# Patient Record
Sex: MALE | Race: WHITE | URBAN - NONMETROPOLITAN AREA
[De-identification: names, ages, dates, MRNs, and addresses within clinical notes are randomized per-mention and may not be internally consistent; named-entity substitution may affect disease eponyms.]

---

## 2018-01-07 ENCOUNTER — NURSE TRIAGE (OUTPATIENT)
Dept: ADMINISTRATIVE | Age: 58
End: 2018-01-07

## 2023-05-01 ENCOUNTER — HOSPITAL ENCOUNTER (OUTPATIENT)
Dept: AUDIOLOGY | Age: 63
Discharge: HOME | End: 2023-05-01
Payer: COMMERCIAL

## 2023-05-01 PROCEDURE — 92557 COMPREHENSIVE HEARING TEST: CPT | Performed by: AUDIOLOGIST

## 2023-05-01 NOTE — PROGRESS NOTES
AUDIOLOGICAL EVALUATION      REASON FOR TESTING:  Patient concerned about possible hearing loss as he does not always hear rapid speech clearly or he feels that people do not speak clearly. He has a history of occupational noise exposure with consistent hearing protection use. He also has noise exposure related to flying, trap shooting, hunting and use of power tools. He wears foam ear plugs when trap shooting but did not use hearing protection while hunting. He wears sound cancelling ear buds while mowing. He denies tinnitus, otalgia, ear fullness, ear pressure, dizziness and vertigo. OTOSCOPY: Clear canal with normal appearing tympanic membrane- bilaterally. AUDIOGRAM          Reliability: Good    COMMENTS: Normal hearing at 250-3000 Hz dropping to a moderate rising to mild sensorineural hearing loss at 6809-7632 Hz for the left ear. Normal at 250-4000 Hz dropping to a borderline normal hearing at 3566-7973 Hz in the right ear. An asymmetry is noted with the left ear being poorer at 2981-8043 Hz. Word recognition ability is excellent at 100% for the left ear and excellent at 100% for the right ear when tested at an average conversational speech level. Tympanometry not completed. RECOMMENDATION(S):   1- Follow up with Dr. Kobe Ng regarding these results and any further testing or treatment recommendations. 2- Further testing may be considered to rule out possible retrocochlear pathology due to asymmetry. 3- Repeat testing as medically indicated or in 6 months to monitor for progression, sooner with any significant changes or new concerns.

## 2025-02-24 ENCOUNTER — HOSPITAL ENCOUNTER (OUTPATIENT)
Dept: PHYSICAL THERAPY | Age: 65
Setting detail: THERAPIES SERIES
Discharge: HOME | End: 2025-02-24
Payer: COMMERCIAL

## 2025-02-24 PROCEDURE — 97535 SELF CARE MNGMENT TRAINING: CPT

## 2025-02-24 PROCEDURE — 97530 THERAPEUTIC ACTIVITIES: CPT

## 2025-02-24 PROCEDURE — 97112 NEUROMUSCULAR REEDUCATION: CPT

## 2025-02-24 PROCEDURE — 97167 OT EVAL HIGH COMPLEX 60 MIN: CPT

## 2025-02-24 NOTE — PROGRESS NOTES
pulling before and after with difficulty starting stream   “Falling Out” Feeling: no   Urinate more than 7 times/day: yes   Restrict Fluid Intake: Yes with flying   Stream Strength Weak     BOWEL ASSESSMENT [] Deferred secondary to:   Frequency: Daily    Most Common Stool Consistency: [place] type 3   SYMPTOM QUESTIONNAIRE   Strain to have a BM: yes: intermittently will rock back and forth    Include fiber in your diet: No takes centrum silver  Does fresh fruits   Take laxatives/enemas regularly: no   Pain with BM: no   Strong urge to have BM: yes: with take off and landing   Leak/Stain Feces: At times with softer stools     Diarrhea often: no    History of Hemmrhiods      SEXUAL ASSESSMENT [] Deferred secondary to:   Sexually Active yes   Pain with [place] (Dyspareunia) Recently sustaining an erection for a few months         VITALS [] Deferred secondary to:   Height Ht Readings from Last 3 Encounters:   No data found for Ht      Weight Wt Readings from Last 3 Encounters:   No data found for Wt         GENERAL ASSESSMENT   [] Deferred secondary to:   Palpation    Observation    Posture {PT PELVIC HEALTH POSTURE:81455}   Range of Motion {PT AROM:49382}   Strength {PT STRENGTH:28606}   Gait {WNL WFL IMPAIRED:45799}   Sensation {WNL WFL IMPAIRED:58528}   Edema {WNL WFL IMPAIRED:42318}   Balance/Fall History {PT FALL HISTORY:60808}   Special Tests            OBSERVATION  [] Deferred secondary to:   Patient Safety {PT PELVIC FLOOR CHAPERONE:99699}   Skin Condition    Urogenital Triangle {PT UROGENITAL TRIANGLE:98668}   Introitus     Perineal Descent     External Clock         PELVIC FLOOR INTERNAL EXAM [] Deferred secondary to:   Exam {PT PELVIC FLOOR LOCATION:35153}   Sensation {PT PELVIC FLOOR SENSATION:04836}   Muscle Localization {PT MUSCLE LOCALIZATION:61363}   Palpation/Tone {PT PELVIC FLOOR TONE:33750}   Pelvic Floor Strength {PT PELVIC FLOOR STRENGTH:69135}   Relax after Contraction {PT PELVIC FLOOR

## 2025-03-04 ENCOUNTER — HOSPITAL ENCOUNTER (OUTPATIENT)
Dept: PHYSICAL THERAPY | Age: 65
Setting detail: THERAPIES SERIES
Discharge: HOME | End: 2025-03-04
Payer: COMMERCIAL

## 2025-03-04 PROCEDURE — 97140 MANUAL THERAPY 1/> REGIONS: CPT

## 2025-03-04 PROCEDURE — 97112 NEUROMUSCULAR REEDUCATION: CPT

## 2025-03-04 PROCEDURE — 97530 THERAPEUTIC ACTIVITIES: CPT

## 2025-03-04 PROCEDURE — 97535 SELF CARE MNGMENT TRAINING: CPT

## 2025-03-04 NOTE — PROGRESS NOTES
treatment.  This pt will demo independence with advanced HEP in order to allow gains in therapy to be maintained long term and reduce the risk of further medical need/assessment.    This pt will demo 4+/5 strength of the core with habitual inferior to superior contraction in order to increase pelvic organ support during cough/sneeze/lift and to promote continence via effective pelvic brace.      The pt will abolish nocturia in order to attain restful sleep patterns.       PLAN:  Treatment Recommendations: Strengthening, Neuromuscular Re-education, Manual Therapy - Soft Tissue Mobilization, Manual Therapy - Joint Manipulation, Home Exercise Program, Patient Education, Self-Care Education and Training, and Integrative Dry Needling    [x]  Plan of care initiated.  Plan to see patient weekly for 10 visits to address the treatment planned outlined above.  []  Continue with current plan of care  []  Modify plan of care as follows:    []  Hold pending physician visit  []  Discharge    Time In 0900   Time Out 1030   Timed Code Minutes: 90 min   Total Treatment Time: 90 min   ASHLEY Morfin, OTR/L  Lic #: [id number]      Electronically Signed by: Asia Valerio OT

## 2025-03-11 ENCOUNTER — HOSPITAL ENCOUNTER (OUTPATIENT)
Dept: PHYSICAL THERAPY | Age: 65
Setting detail: THERAPIES SERIES
Discharge: HOME | End: 2025-03-11
Payer: COMMERCIAL

## 2025-03-11 PROCEDURE — 97530 THERAPEUTIC ACTIVITIES: CPT

## 2025-03-11 PROCEDURE — 97112 NEUROMUSCULAR REEDUCATION: CPT

## 2025-03-11 NOTE — PROGRESS NOTES
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   Allergies NKA   Medications No current outpatient medications on file.Atorvastatin (40MG), Cartia (120 MG)  Finasteride (5mg)  Levothroxine (88 mcg)  Losartan (100Mg)  Nebivolol (10 Mg)  Nystatin 908457 unit/GM  Tadalafil 5 Mg  Tamsulosin HCI.      Functional Outcome Measure Used NIH-CPSI   Functional Outcome Score 24 (25)       Insurance Primary: Payor: WILLIE AGUIRRE /  /  / ,   Secondary:    Authorization Information PRE CERTIFICATION REQUIRED: Additional authorization not required.  INSURANCE THERAPY BENEFIT: Allowed 60 visits of OT, PT, and ST COMBINED per calendar year.  0 visits have been used.. Hard Max..   AQUATIC THERAPY COVERED: Yes  MODALITIES COVERED:  Yes   Initial CPT Codes Requested 97110-Therapeutic Exercise,  97140-Manual Therapy, 97530-Therapeutic Activities, 90593-FU ADL Training, and 03551-Dwcfsszkguuiz Re-Education   Progress Note Counter 3/10 for progress note (Reporting Period: 25 to Present    )   Recertification Date 25   Physician Follow-Up Unknown this date   Physician Orders

## 2025-03-25 ENCOUNTER — APPOINTMENT (OUTPATIENT)
Dept: PHYSICAL THERAPY | Age: 65
End: 2025-03-25
Payer: COMMERCIAL

## 2025-04-03 ENCOUNTER — APPOINTMENT (OUTPATIENT)
Dept: PHYSICAL THERAPY | Age: 65
End: 2025-04-03
Payer: COMMERCIAL

## 2025-04-14 ENCOUNTER — APPOINTMENT (OUTPATIENT)
Dept: PHYSICAL THERAPY | Age: 65
End: 2025-04-14
Payer: COMMERCIAL

## 2025-04-15 ENCOUNTER — HOSPITAL ENCOUNTER (OUTPATIENT)
Dept: PHYSICAL THERAPY | Age: 65
Setting detail: THERAPIES SERIES
Discharge: HOME | End: 2025-04-15
Payer: COMMERCIAL

## 2025-04-15 PROCEDURE — 97535 SELF CARE MNGMENT TRAINING: CPT

## 2025-04-15 PROCEDURE — 97530 THERAPEUTIC ACTIVITIES: CPT

## 2025-04-15 PROCEDURE — 97112 NEUROMUSCULAR REEDUCATION: CPT

## 2025-04-15 NOTE — PROGRESS NOTES
[facility]  OCCUPATIONAL THERAPY  OUTPATIENT REHABILITATION - SPECIALIZED THERAPY SERVICES  [] PELVIC HEALTH EVALUATION  [x] DAILY NOTE  [] PROGRESS NOTE [] DISCHARGE NOTE    Date: 4/15/2025  Patient Name:  Cruz Da Silva  : 1960  MRN: [id number]  CSN: [id number]    Referring Practitioner Jesus Correia MD [id number]      Diagnosis  Diagnoses       N40.1 (ICD-10-CM) - Benign prostatic hyperplasia with lower urinary tract symptoms    N13.8 (ICD-10-CM) - Other obstructive and reflux uropathy    M62.89 (ICD-10-CM) - Other specified disorders of muscle           Treatment Diagnosis M62.89 - Other Specified Disorders of Muscle  R39.14 - Feeling of Incomplete Bladder Emptying  R33.8 - Other Retention of Urine  N40.1 - Enlarged Prostate with Lower Urinary Tract Symptoms  R15.2 - Fecal Urgency      Date of Evaluation 25   Additional Pertinent History  See below.   Allergies NKA   Medications No current outpatient medications on file.Atorvastatin (40MG), Cartia (120 MG)  Finasteride (5mg)  Levothroxine (88 mcg)  Losartan (100Mg)  Nebivolol (10 Mg)  Nystatin 190159 unit/GM  Tadalafil 5 Mg  Tamsulosin HCI.      Functional Outcome Measure Used NIH-CPSI   Functional Outcome Score 24 (25)       Insurance Primary: Payor: WILLIE AGUIRRE /  /  / ,   Secondary:    Authorization Information PRE CERTIFICATION REQUIRED: Additional authorization not required.  INSURANCE THERAPY BENEFIT: Allowed 60 visits of OT, PT, and ST COMBINED per calendar year.  0 visits have been used.. Hard Max..   AQUATIC THERAPY COVERED: Yes  MODALITIES COVERED:  Yes   Initial CPT Codes Requested 97110-Therapeutic Exercise,  97140-Manual Therapy, 97530-Therapeutic Activities, 01636-RS ADL Training, and 07108-Rutcetpeqflwi Re-Education   Progress Note Counter 4/10 for progress note (Reporting Period: 25 to Present    )   Recertification Date 25   Physician Follow-Up Unknown this date   Physician Orders

## 2025-04-28 ENCOUNTER — HOSPITAL ENCOUNTER (OUTPATIENT)
Dept: PHYSICAL THERAPY | Age: 65
Setting detail: THERAPIES SERIES
Discharge: HOME | End: 2025-04-28
Payer: COMMERCIAL

## 2025-04-28 PROCEDURE — 97112 NEUROMUSCULAR REEDUCATION: CPT

## 2025-04-28 PROCEDURE — 97535 SELF CARE MNGMENT TRAINING: CPT

## 2025-04-28 NOTE — PROGRESS NOTES
localization and control X35 mins  x PFM lengthening on yoga ball to with coordinated breathing; with better patient understanding PFM lengthening with C-R-B.   Weighted breathing to increase diaphramatic breath with 3-4-5 sequence then same sequence unweighted.     Discussed d/t breath support with bear down. After education verbal, tactile and visual cues pt able to better correct/catch sensation of dyssynergia and able to extinguish PFM activation. This is improvement over prior sessions. Mirror feedback to observe kegel and dyssynergic bear down.     Prior tx:Via TATI dyssynergia noted with VC to relax PFM and facilitate TrA. Difficulty to extinguish PFM during TrA but pt required fading cues x10 reps.     Seated on swiss ball to work on PFM lengthening with inhale. Increased time to facilitate 360* diaphragmatic breathing with tband posterior ribs to increase proprioceptive input.      Manual x10 mins    To B coccygeus, B OI and LA for hypertonicity with referred urinary symptoms reports per pt.    Hip alignment 10 mins    Edu on A & P pelvis in relation to PFM implications   Kegel elevators x2   HEP on hold   Kegel quick x10   HEP   Kegel hold x10 3s  HEP   Tummy tuck quick x10   HEP   Tummy tuck hold x5 3s  HEP   Pelvic Brace Quick       Pelvic Brace Hold       Diaphragmatic breathing X15 mins    EDU on intercostal muscles, use of 360* vs. Belly breathing. Noted wide rib angle. EDU on tucking ribs under with exhale. Significant education on posture.    OI stretch       PFM stretch x2   HEP   Piriformis stretch x2   HEP   HS stretch x2   HEP IR rotation to increase stability in R hip   Marciano stretch x2   HEP   Adductor stretch x2   HEP edu on sustained stretch Vs adduction movement                  Kegel with Ball Squeeze       Kegel with Band                     Pelvic Tilt x10   HEP   Pelvic Tilt with arm lift x10   HEP   Pelvic Tilt with leg march x10   HEP   Pelvic Tilt with opposites x10   HEP   Heel walk

## 2025-05-01 ENCOUNTER — APPOINTMENT (OUTPATIENT)
Dept: PHYSICAL THERAPY | Age: 65
End: 2025-05-01
Payer: COMMERCIAL

## 2025-05-12 ENCOUNTER — HOSPITAL ENCOUNTER (OUTPATIENT)
Dept: PHYSICAL THERAPY | Age: 65
Setting detail: THERAPIES SERIES
Discharge: HOME | End: 2025-05-12
Payer: COMMERCIAL

## 2025-05-12 ENCOUNTER — APPOINTMENT (OUTPATIENT)
Dept: PHYSICAL THERAPY | Age: 65
End: 2025-05-12
Payer: COMMERCIAL

## 2025-05-12 PROCEDURE — 97140 MANUAL THERAPY 1/> REGIONS: CPT

## 2025-05-12 PROCEDURE — 97535 SELF CARE MNGMENT TRAINING: CPT

## 2025-05-12 PROCEDURE — 97110 THERAPEUTIC EXERCISES: CPT

## 2025-05-12 NOTE — PROGRESS NOTES
[facility]  OCCUPATIONAL THERAPY  OUTPATIENT REHABILITATION - SPECIALIZED THERAPY SERVICES  [] PELVIC HEALTH EVALUATION  [x] DAILY NOTE  [] PROGRESS NOTE [] DISCHARGE NOTE    Date: 2025  Patient Name:  Cruz Da Silva  : 1960  MRN: [id number]  CSN: [id number]    Referring Practitioner  [id number]      Diagnosis  Diagnoses       N40.1 (ICD-10-CM) - Benign prostatic hyperplasia with lower urinary tract symptoms    N13.8 (ICD-10-CM) - Other obstructive and reflux uropathy    M62.89 (ICD-10-CM) - Other specified disorders of muscle           Treatment Diagnosis M62.89 - Other Specified Disorders of Muscle  R39.14 - Feeling of Incomplete Bladder Emptying  R33.8 - Other Retention of Urine  N40.1 - Enlarged Prostate with Lower Urinary Tract Symptoms  R15.2 - Fecal Urgency      Date of Evaluation 25   Additional Pertinent History  See below.   Allergies NKA   Medications No current outpatient medications on file.Atorvastatin (40MG), Cartia (120 MG)  Finasteride (5mg)  Levothroxine (88 mcg)  Losartan (100Mg)  Nebivolol (10 Mg)  Nystatin 476518 unit/GM  Tadalafil 5 Mg  Tamsulosin HCI.      Functional Outcome Measure Used NIH-CPSI   Functional Outcome Score 24 (25)       Insurance Primary: Payor: WILLIE AGUIRRE /  /  / ,   Secondary:    Authorization Information PRE CERTIFICATION REQUIRED: Additional authorization not required.  INSURANCE THERAPY BENEFIT: Allowed 60 visits of OT, PT, and ST COMBINED per calendar year.  0 visits have been used.. Hard Max..   AQUATIC THERAPY COVERED: Yes  MODALITIES COVERED:  Yes   Initial CPT Codes Requested 97110-Therapeutic Exercise,  97140-Manual Therapy, 97530-Therapeutic Activities, 50000-RI ADL Training, and 02713-Uoxcobczmaxhb Re-Education   Progress Note Counter 6/10 for progress note (Reporting Period: 25 to Present    )   Recertification Date 25   Physician Follow-Up Unknown this date   Physician Orders Evaluate and treat   History

## 2025-05-13 ENCOUNTER — APPOINTMENT (OUTPATIENT)
Dept: PHYSICAL THERAPY | Age: 65
End: 2025-05-13
Payer: COMMERCIAL

## 2025-06-10 ENCOUNTER — HOSPITAL ENCOUNTER (OUTPATIENT)
Dept: PHYSICAL THERAPY | Age: 65
Setting detail: THERAPIES SERIES
Discharge: HOME | End: 2025-06-10
Payer: COMMERCIAL

## 2025-06-10 PROCEDURE — 97140 MANUAL THERAPY 1/> REGIONS: CPT

## 2025-06-10 PROCEDURE — 97112 NEUROMUSCULAR REEDUCATION: CPT

## 2025-06-10 PROCEDURE — 97535 SELF CARE MNGMENT TRAINING: CPT

## 2025-06-10 NOTE — PROGRESS NOTES
deficits which could lead to decreased engagement in meaningful daily occupations and quality of life.    Body Structures/Functions/Activity Limitations: PFM weakness with decreased localization and endurance, PFM dyssynergia, Poor PFM control with limited ability to completely relax, Decreased awareness of normal bladder and bowel habits, control, and diet effects on functioning, Abdominal and core weakness, Limited hip girdle flexibility, Impaired coordination, Impaired motor control, and Impaired strength  Prognosis: Good    GOALS:  Patient Goal:  planes without urinary concerns    Short Term Goal Timeframe: 5 Weeks  Short Term Goals:  Patient to identify normal bladder function and voiding patterns, diet effects on bladder, urge control strategies, and constipation/optimal stool consistency management.  Patient to delay voiding for 15 minute with out leaking.  Patient will Increase PFM localization to good with good ability to instantly and completely relax.  Patient to demonstrate and verbalize good knowledge of safe lifting, pushing, pulling strategies that limit pelvic organ stress.  The pt will be able to describe normal bowel function, bowel irritants, and bowel anatomy and physiology to promote insight into current condition and to allow the pt to identify causative factors related to current condition.     The pt will be able to delay fecal urge x 5' in order to allow time to get to a bathroom without soiling clothing.   The pt will The pt will reduce incidence of fecal leakage by 50 % and amount of material leaked by 50% to increase ease of hygiene.    Patient will reduce nocturia to 1-2 times per night to establish more restful sleep patterns.    Long Term Goal Timeframe: 10 Weeks  Long-Term Goals:  Patient will ncrease PFM strength to 3+/5 with endurance of 8 seconds x 10 reps to increase pelvic organ support and decrease incontinence.  Patient to improve void frequency to every 2-3 hours during

## 2025-06-24 ENCOUNTER — HOSPITAL ENCOUNTER (OUTPATIENT)
Dept: PHYSICAL THERAPY | Age: 65
Setting detail: THERAPIES SERIES
Discharge: HOME | End: 2025-06-24
Payer: COMMERCIAL

## 2025-06-24 PROCEDURE — 97140 MANUAL THERAPY 1/> REGIONS: CPT

## 2025-06-24 PROCEDURE — 97530 THERAPEUTIC ACTIVITIES: CPT

## 2025-06-24 PROCEDURE — 97535 SELF CARE MNGMENT TRAINING: CPT

## 2025-06-24 PROCEDURE — 97112 NEUROMUSCULAR REEDUCATION: CPT

## 2025-06-24 NOTE — PROGRESS NOTES
[facility]  OCCUPATIONAL THERAPY  OUTPATIENT REHABILITATION - SPECIALIZED THERAPY SERVICES  [] PELVIC HEALTH EVALUATION  [x] DAILY NOTE  [] PROGRESS NOTE [] DISCHARGE NOTE    Date: 2025  Patient Name:  Cruz Da Silva  : 1960  MRN: [id number]  CSN: [id number]    Referring Practitioner Jesus Correia MD [id number]      Diagnosis  Diagnoses       N40.1 (ICD-10-CM) - Benign prostatic hyperplasia with lower urinary tract symptoms    N13.8 (ICD-10-CM) - Other obstructive and reflux uropathy    M62.89 (ICD-10-CM) - Other specified disorders of muscle           Treatment Diagnosis M62.89 - Other Specified Disorders of Muscle  R39.14 - Feeling of Incomplete Bladder Emptying  R33.8 - Other Retention of Urine  N40.1 - Enlarged Prostate with Lower Urinary Tract Symptoms  R15.2 - Fecal Urgency      Date of Evaluation 25   Additional Pertinent History  See below.   Allergies NKA   Medications No current outpatient medications on file.Atorvastatin (40MG), Cartia (120 MG)  Finasteride (5mg)  Levothroxine (88 mcg)  Losartan (100Mg)  Nebivolol (10 Mg)  Nystatin 850655 unit/GM  Tadalafil 5 Mg  Tamsulosin HCI.      Functional Outcome Measure Used NIH-CPSI   Functional Outcome Score 24 (25)       Insurance Primary: Payor: WILLIE AGUIRRE /  /  / ,   Secondary:    Authorization Information PRE CERTIFICATION REQUIRED: Additional authorization not required.  INSURANCE THERAPY BENEFIT: Allowed 60 visits of OT, PT, and ST COMBINED per calendar year.  0 visits have been used.. Hard Max..   AQUATIC THERAPY COVERED: Yes  MODALITIES COVERED:  Yes   Initial CPT Codes Requested 97110-Therapeutic Exercise,  97140-Manual Therapy, 97530-Therapeutic Activities, 12850-PF ADL Training, and 62126-Gvasbkncdzldg Re-Education   Progress Note Counter 8/10 for progress note (Reporting Period: 25 to Present    )   Recertification Date 25   Physician Follow-Up Unknown this date   Physician Orders

## 2025-07-08 ENCOUNTER — HOSPITAL ENCOUNTER (OUTPATIENT)
Dept: PHYSICAL THERAPY | Age: 65
Setting detail: THERAPIES SERIES
Discharge: HOME | End: 2025-07-08
Payer: MEDICARE

## 2025-07-08 PROCEDURE — 97530 THERAPEUTIC ACTIVITIES: CPT

## 2025-07-08 PROCEDURE — 97535 SELF CARE MNGMENT TRAINING: CPT

## 2025-07-08 NOTE — PROGRESS NOTES
with good control in order to allow pt time to seek a public bathroom.   This pt will report normal urinary frequency to enabling ease of work/home task completion without interruption.     This pt will decrease NIH-CPI score by 50% respectively to indicate improved continence and efficacy of treatment.  This pt will demo independence with advanced HEP in order to allow gains in therapy to be maintained long term and reduce the risk of further medical need/assessment.    This pt will demo 4+/5 strength of the core with habitual inferior to superior contraction in order to increase pelvic organ support during cough/sneeze/lift and to promote continence via effective pelvic brace.      The pt will abolish nocturia in order to attain restful sleep patterns.       PLAN:  Treatment Recommendations: Strengthening, Neuromuscular Re-education, Manual Therapy - Soft Tissue Mobilization, Manual Therapy - Joint Manipulation, Home Exercise Program, Patient Education, Self-Care Education and Training, and Integrative Dry Needling    []  Plan of care initiated.  Plan to see patient weekly for 10 visits to address the treatment planned outlined above.  [x]  Continue with current plan of care  []  Modify plan of care as follows:    []  Hold pending physician visit  []  Discharge    Time In 0900   Time Out 1000   Timed Code Minutes: 60 min   Total Treatment Time: 60 min   DIONNE Dos Santos, OTR/L  Lic #: [id number]      Electronically Signed by: Apple Persaud OT

## 2025-08-04 ENCOUNTER — APPOINTMENT (OUTPATIENT)
Dept: PHYSICAL THERAPY | Age: 65
End: 2025-08-04
Payer: MEDICARE

## 2025-08-19 ENCOUNTER — HOSPITAL ENCOUNTER (OUTPATIENT)
Dept: PHYSICAL THERAPY | Age: 65
Setting detail: THERAPIES SERIES
Discharge: HOME | End: 2025-08-19
Payer: MEDICARE

## 2025-08-19 PROCEDURE — 97535 SELF CARE MNGMENT TRAINING: CPT

## 2025-08-19 PROCEDURE — 97112 NEUROMUSCULAR REEDUCATION: CPT

## 2025-08-19 PROCEDURE — 97140 MANUAL THERAPY 1/> REGIONS: CPT
